# Patient Record
Sex: FEMALE | Race: WHITE | NOT HISPANIC OR LATINO | Employment: UNEMPLOYED | URBAN - METROPOLITAN AREA
[De-identification: names, ages, dates, MRNs, and addresses within clinical notes are randomized per-mention and may not be internally consistent; named-entity substitution may affect disease eponyms.]

---

## 2017-06-01 ENCOUNTER — TRANSFERRED RECORDS (OUTPATIENT)
Dept: HEALTH INFORMATION MANAGEMENT | Facility: CLINIC | Age: 52
End: 2017-06-01

## 2017-06-09 ENCOUNTER — TRANSFERRED RECORDS (OUTPATIENT)
Dept: HEALTH INFORMATION MANAGEMENT | Facility: CLINIC | Age: 52
End: 2017-06-09

## 2017-06-19 ENCOUNTER — TRANSFERRED RECORDS (OUTPATIENT)
Dept: HEALTH INFORMATION MANAGEMENT | Facility: CLINIC | Age: 52
End: 2017-06-19

## 2017-10-19 ENCOUNTER — TRANSFERRED RECORDS (OUTPATIENT)
Dept: HEALTH INFORMATION MANAGEMENT | Facility: CLINIC | Age: 52
End: 2017-10-19

## 2017-10-23 ENCOUNTER — MEDICAL CORRESPONDENCE (OUTPATIENT)
Dept: HEALTH INFORMATION MANAGEMENT | Facility: CLINIC | Age: 52
End: 2017-10-23

## 2017-11-15 DIAGNOSIS — H53.10 SUBJECTIVE VISUAL DISTURBANCE: Primary | ICD-10-CM

## 2017-12-21 ENCOUNTER — OFFICE VISIT (OUTPATIENT)
Dept: OPHTHALMOLOGY | Facility: CLINIC | Age: 52
End: 2017-12-21
Attending: OPHTHALMOLOGY
Payer: COMMERCIAL

## 2017-12-21 DIAGNOSIS — Z13.5 ENCOUNTER FOR SCREENING FOR EYE AND EAR DISORDERS: ICD-10-CM

## 2017-12-21 DIAGNOSIS — Z13.5 ENCOUNTER FOR SCREENING FOR EYE AND EAR DISORDERS: Primary | ICD-10-CM

## 2017-12-21 DIAGNOSIS — Z85.43 OVARIAN CANCER IN REMISSION: ICD-10-CM

## 2017-12-21 DIAGNOSIS — H53.10 SUBJECTIVE VISUAL DISTURBANCE: ICD-10-CM

## 2017-12-21 DIAGNOSIS — H53.60 NYCTALOPIA: Primary | ICD-10-CM

## 2017-12-21 DIAGNOSIS — H53.40 VISUAL FIELD DEFECT: ICD-10-CM

## 2017-12-21 PROCEDURE — 92275 FFERG OU (BOTH EYES): CPT | Mod: ZF

## 2017-12-21 PROCEDURE — 40000269 ZZH STATISTIC NO CHARGE FACILITY FEE: Mod: ZF

## 2017-12-21 PROCEDURE — 99214 OFFICE O/P EST MOD 30 MIN: CPT | Mod: ZF | Performed by: TECHNICIAN/TECHNOLOGIST

## 2017-12-21 PROCEDURE — 92133 CPTRZD OPH DX IMG PST SGM ON: CPT | Mod: ZF | Performed by: OPHTHALMOLOGY

## 2017-12-21 PROCEDURE — 92083 EXTENDED VISUAL FIELD XM: CPT | Mod: ZF | Performed by: OPHTHALMOLOGY

## 2017-12-21 RX ORDER — CITALOPRAM HYDROBROMIDE 40 MG/1
40 TABLET ORAL
COMMUNITY
Start: 2014-12-03

## 2017-12-21 RX ORDER — EPINEPHRINE 0.3 MG/.3ML
INJECTION SUBCUTANEOUS
COMMUNITY
Start: 2015-01-28

## 2017-12-21 RX ORDER — FUROSEMIDE 40 MG
40 TABLET ORAL
COMMUNITY
Start: 2015-01-19

## 2017-12-21 RX ORDER — OXYCODONE AND ACETAMINOPHEN 7.5; 325 MG/1; MG/1
TABLET ORAL
COMMUNITY
Start: 2015-07-07

## 2017-12-21 RX ORDER — CLINDAMYCIN HCL 150 MG
300 CAPSULE ORAL
COMMUNITY
Start: 2015-12-27

## 2017-12-21 RX ORDER — MAGNESIUM 200 MG
1000 TABLET ORAL
COMMUNITY
Start: 2011-10-13

## 2017-12-21 RX ORDER — PREGABALIN 75 MG/1
75 CAPSULE ORAL
COMMUNITY
Start: 2015-06-11

## 2017-12-21 RX ORDER — LISINOPRIL 10 MG/1
10 TABLET ORAL
COMMUNITY
Start: 2014-12-19

## 2017-12-21 ASSESSMENT — CONF VISUAL FIELD
OS_NORMAL: 1
OD_NORMAL: 1
METHOD: COUNTING FINGERS

## 2017-12-21 ASSESSMENT — VISUAL ACUITY
OS_CC+: -1+2
OS_PH_CC: 20/25
OD_CC+: -2
OS_PH_CC: 20/25
OD_CC: 20/30
OS_CC+: -1+2
OS_CC: 20/30
OD_CC: 20/30
METHOD: SNELLEN - LINEAR
OS_CC: 20/30
METHOD: SNELLEN - LINEAR
OD_CC+: -2
CORRECTION_TYPE: GLASSES

## 2017-12-21 ASSESSMENT — REFRACTION_WEARINGRX
OS_AXIS: 090
OD_HPRISM: 1 BO
OD_CYLINDER: +0.75
SPECS_TYPE: BIFOCAL
OD_HPRISM: 1 BO
OD_ADD: +2.00
OS_HPRISM: 1 BO
OD_ADD: +2.00
SPECS_TYPE: BIFOCAL
OD_CYLINDER: +0.75
OS_CYLINDER: +1.25
OS_SPHERE: -1.75
OD_SPHERE: -1.00
OD_AXIS: 080
OS_CYLINDER: +1.25
OD_AXIS: 080
OS_SPHERE: -1.75
OD_SPHERE: -1.00
OS_ADD: +2.00
OS_HPRISM: 1 BO
OS_ADD: +2.00
OS_AXIS: 090

## 2017-12-21 ASSESSMENT — TONOMETRY
IOP_METHOD: ICARE
OD_IOP_MMHG: 18
OS_IOP_MMHG: 18

## 2017-12-21 ASSESSMENT — EXTERNAL EXAM - LEFT EYE: OS_EXAM: NORMAL

## 2017-12-21 ASSESSMENT — SLIT LAMP EXAM - LIDS
COMMENTS: NORMAL
COMMENTS: NORMAL

## 2017-12-21 ASSESSMENT — EXTERNAL EXAM - RIGHT EYE: OD_EXAM: NORMAL

## 2017-12-21 NOTE — PROGRESS NOTES
"2017    STANDARD ERG REPORT    Referring: Dr. Claus Chavarria.     RE:  Adela Kelly  MRN:  [id number]  :  1965    ERG Date:  2017    CLINICAL HISTORY:  Adela Kelly is a 52 year old year-old patient with history of fuzzy and blurry vision, referred for a ffERG.  +Astig since childhood and new gls since presbyopia help but still difficulty with vision. Reports difficulty with night driving (rain/snow is worse), needs to focus more and maintain attention straight ahead.  If she moves head side to side or blinks or has to change lanes, \"partial image\" in front of her moves so that she sees 2 sets of lines on the road.  Reflective street signs and glare from headlights can \"blind\". Worsening probs with depth perception where she has missed steps and fallen x3 within 1-month's time.    IMPRESSION:  1. mild, non-specific electroretinogram changes of unknown clinical significance.       2.  likely Normal electroretinogram                 Visual Acuity Right Eye : 20/30-2, PHNI       w/gls, -1.00 + 0.75x080 1BO prism    Visual Acuity Left Eye : 20/30-1+2, PH 20/25      w/gls, -1.75 + 1.25x090, 1BO prism                         ALL AVERAGED  Data for Full-Field ERG Right Eye   Left Eye    Dark-Adapted Patient Normal Patient   0.01 ERG (fanta) amplitude( v) 223 101.6-336.1 241   0.01 ERG (fanta) implicit time(ms) 96.5 73.2-106.2 95.7   MMMMM      3.0 ERG (combined) a-wave amplitude( v) -163 -218.0 to -67.8 -178   3.0 ERG (combined) a-wave implicit time(ms) 15.8 13.0-22.8 15.8   3.0 ERG (combined) b-wave amplitude( v) 307 124.1-414.8 333   3.0 ERG (combined) b-wave implicit time(ms) 49.9 29.7-52.8 49.9   MMMMM      3.0 Oscillatory Potentials   Present     Light-Adapted      3.0 Flicker (30-Hz) amplitude( v) 63 77.8-138.3 64   3.0 Flicker (30-Hz) implicit time(ms) 25.8 23.9-28.3 26.6         3.0 ERG (cone) a-wave amplitude( v) -19 -59.4 to -9.1 -21   3.0 ERG (cone) a-wave implicit time(ms) 15 " 16.2-18.0 15   3.0 ERG (cone) b-wave amplitude( v) 81 52.7-185.6 84   3.0 ERG (cone) b-wave implicit time(ms) 30 26.2-31.3 30      *=manipulated cursors    INTERPRETATION:      The electroretinogram was performed according to ISCEV standards using ESPION E3 system and DTL fiber recording electrodes. The patient tolerated the testing well. The waveforms are fairly reproducible and well formed. The responses in between both eyes were similar. The normative values provided above represent the 95% confidence limits for a normal individual the age of the patient. The patient s responses are averaged.    In scotopic conditions, the fanta specific responses were normal in both eyes.  The maximal response, a combined fanta and cone responses were essentially normal and the implicit time was normal in both eyes.   In light adapted conditions, the 30-Hz flicker response has mild decreased amplitude both eyes and normal implicit time in both eyes. The single photopic flash response are normal.    Conclusion: This represents a likely normal electroretinogram  with the exception of  mild decreased amplitude of the 30-Hz flicker responses both eyes. This is a nonspecific finding. Certainly, there is no significant loss of fanta or cone photoreceptors.     Clinical correlation is recommended.       I would recommend a repeated electroretinogram in a couple of years if there continues to be concern regarding a possible retinal dystrophy.     Dear Elvin, thank you for the opportunity to provide electrophysiologic services for this patient.  Please do not hesitate to call if there should be any questions regarding these results.    Leigha Pablo MD     Retina Service   Department of Ophthalmology and Visual Neurosciences   [facility]  Phone: [phone]   Fax: [phone]

## 2017-12-21 NOTE — LETTER
2017         RE:  :  MRN: Adela Kelly  1965  [id number]     Dear Dr. Chavarria,    Thank you for asking me to see your very pleasant patient, Adela Kelly, in neuro-ophthalmic consultation.  I would like to thank you for sending your records and I have summarized them in the history of present illness. She presented with her spouse who provided additional history.  My assessment and plan are below.  For further details, please see my attached clinic note.      Assessment & Plan     Adela Kelly is a 52 year old female with the following diagnoses:   1. Nyctalopia    2. Subjective visual disturbance    3. Ovarian cancer in remission    4. Visual field defect       She noted problems with depth perception when she was a young woman. She endorses blurry vision both eyes.  Overall, it has gotten worse.  Glasses did not seem to help.  She describes that objects  Seem to move.  As she looks at me, she thinks my eyes drift to the right and then jerk back.  When she looks at a white wall she sees stuff moving for many years.  She thinks it looks like television snow.  She struggles with checkerboard patterns and intricate patterns in her vision.  She endorses palinopsia.  No history of LSD or mescaline. No use of trazodone.   Denies perseveration of visual images.      She had ovarian cancer.  She had a resection. She underwent chemo in .  She endorses nyctalopia for many years.      Many of her symptoms sound migrainous including photophobia, persistent positive visual phenomena of migraine, difficulty with patterns.  She also has nyctalopia and a history of ovarian cancer.  Will obtain an electroretinogram (ERG) to make sure this is not paraneoplastic.     If the ERG is borderline or normal then the symptoms are nearly migraine-related in origin.  If the ERG is severely depressed, then I would perform a paraneoplastic workup including a CAT scan of the chest abdomen and pelvis.  I will also order a   lab tests to determine whether she has evidence of recurrence of her ovarian cancer.    Again, thank you for allowing me to participate in the care of your patient.      Sincerely,    Claus Whitney MD  Professor, Neuro-Ophthalmology  Department of Ophthalmology and Visual Neurosciences  [facility]    CC: VARUN [address] [facility] [address]  VIA Facsimile: [phone]     Alise Foley NP  [facility]  [address]  [place] [address]  VIA Facsimile: [phone]     Charles A Tietz, MD  [facility]  [address] [facility] [address]  VIA Facsimile: [phone]

## 2017-12-21 NOTE — LETTER
"2017    STANDARD ERG REPORT    Referring: Dr. Claus Whitney                        RE:  Adela Kelly  MRN:  [id number]  :  1965    ERG Date:  2017    CLINICAL HISTORY:   Adela Kelly is a 52-year-old patient with history of fuzzy and blurry vision, referred for a ffERG.  +Astig since childhood and new gls since presbyopia help but still difficulty with vision. Reports difficulty with night driving (rain/snow is worse), needs to focus more and maintain attention straight ahead.  If she moves head side to side or blinks or has to change lanes, \"partial image\" in front of her moves so that she sees 2 sets of lines on the road.  Reflective street signs and glare from headlights can \"blind\". Worsening probs with depth perception where she has missed steps and fallen x3 within 1-months' time.    IMPRESSION:  1. Mild, non-specific electroretinogram changes of unknown clinical significance     2. Likely normal electroretinogram                 Visual Acuity: Right Eye:   20/30-2, PHNI             w/gls, -1.00 + 0.75x080 1BO prism    Visual Acuity:  Left Eye:     20/30-1+2, PH 20/25            w/gls, -1.75 + 1.25x090, 1BO prism                                        ALL AVERAGED  Data for Full-Field ERG Right Eye   Left Eye    Dark-Adapted Patient Normal Patient   0.01 ERG (fanta) amplitude( v) 223 101.6-336.1 241   0.01 ERG (fanta) implicit time(ms) 96.5 73.2-106.2 95.7   MMMMM      3.0 ERG (combined) a-wave amplitude( v) -163 -218.0 to -67.8 -178   3.0 ERG (combined) a-wave implicit time(ms) 15.8 13.0-22.8 15.8   3.0 ERG (combined) b-wave amplitude( v) 307 124.1-414.8 333   3.0 ERG (combined) b-wave implicit time(ms) 49.9 29.7-52.8 49.9   MMMMM      3.0 Oscillatory Potentials   Present     Light-Adapted      3.0 Flicker (30-Hz) amplitude( v) 63 77.8-138.3 64   3.0 Flicker (30-Hz) implicit time(ms) 25.8 23.9-28.3 26.6         3.0 ERG (cone) a-wave amplitude( v) -19 -59.4 to -9.1 -21   3.0 ERG " (cone) a-wave implicit time(ms) 15 16.2-18.0 15   3.0 ERG (cone) b-wave amplitude( v) 81 52.7-185.6 84   3.0 ERG (cone) b-wave implicit time(ms) 30 26.2-31.3 30                  *=manipulated cursors    INTERPRETATION:  This electroretinogram was performed according to ISCEV standards using the ESPION E3 system and DTL fiber recording electrodes. The patient tolerated the testing well. The waveforms are fairly reproducible and well formed. The responses in between both eyes were similar. The normative values provided above represent the 95% confidence limits for a normal individual the age of the patient. The patient s responses are averaged.    In scotopic conditions, the fanta-specific responses were normal in both eyes. The maximal response, a combined fanta and cone response, was essentially normal and the implicit time was normal in both eyes.     In light-adapted conditions, the 30-Hz flicker response has mildly decreased amplitude in both eyes and normal implicit time in both eyes. The single photopic flash responses are normal.    CONCLUSION: This represents a likely normal electroretinogram with the exception of mildly decreased amplitude of the 30-Hz flicker responses in both eyes. This is a nonspecific finding. Certainly, there is no significant loss of fanta or cone photoreceptors. Clinical correlation is recommended.       I would recommend a repeat electroretinogram in a couple of years if there continues to be concern regarding a possible retinal dystrophy.     Elvin, thank you for the opportunity to provide electrophysiologic services for this patient.  Please do not hesitate to call if there should be any questions regarding these results.    Sincerely,    Leigha Pablo MD     Retina Service   Department of Ophthalmology and Visual Neurosciences   [facility]  Phone: [phone]   Fax: [phone]        cc:  Jamey Chavarria MD

## 2017-12-21 NOTE — MR AVS SNAPSHOT
After Visit Summary   12/21/2017    Adela Kelly    MRN: [id number]           Patient Information     Date Of Birth          1965        Visit Information        Provider Department      12/21/2017 7:30 AM Claus Whitney MD [facility]        Today's Diagnoses     Nyctalopia    -  1    Subjective visual disturbance        Ovarian cancer in remission        Visual field defect           Follow-ups after your visit        Additional Services     ERG Referral                 Your next 10 appointments already scheduled     Dec 21, 2017  1:00 PM CST   Eye Diagnostics with [facility] EYE ELECTRODIAGNOSTIC   Eye Clinic ([facility])    [address]  [address]  [address]  [facility] [address]   [phone]              Future tests that were ordered for you today     Open Future Orders        Priority Expected Expires Ordered     Routine  3/21/2018 12/21/2017            Who to contact     Please call your clinic at [phone] to:    Ask questions about your health    Make or cancel appointments    Discuss your medicines    Learn about your test results    Speak to your doctor   If you have compliments or concerns about an experience at your clinic, or if you wish to file a complaint, please contact [facility] Patient Relations at [phone] or email us at [email or web address]         Additional Information About Your Visit        MyChart Information     [employer] is an electronic gateway that provides easy, online access to your medical records. With [employer], you can request a clinic appointment, read your test results, renew a prescription or communicate with your care team.     To sign up for Tinker [employer] visit the website at [email or web address]   You will be asked to enter the access code listed below, as well as some personal information. Please follow the directions to create your username and password.     Your  access code is: [email or web address]  Expires: 2018  5:30 AM     Your access code will  in 90 days. If you need help or a new code, please contact your [facility] or call [phone] for assistance.        Care EveryWhere ID     This is your Care EveryWhere ID. This could be used by other organizations to access your [place] medical records  [id number]         Blood Pressure from Last 3 Encounters:   No data found for BP    Weight from Last 3 Encounters:   No data found for Wt              We Performed the Following     ERG Referral     Glaucoma Top OU     OCT Optic Nerve RNFL Spectralis OU (both eyes)        Primary Care Provider Office Phone # Fax #    Alise Foley [phone]       [facility] [address] [place] [address]        Equal Access to Services     MARIELLE ROSS : Hadii aad ku hadasho Soomaali, waaxda luqadaha, qaybta kaalmada adeegyada, waxay grover hayzenaida lorenzo . So [facility] [place] [phone].    ATENCIÓN: Si habla español, tiene a callahan disposición servicios gratuitos de asistencia lingüística. Dhruv al [phone].    We comply with applicable federal civil rights laws and [place] laws. We do not discriminate on the basis of race, color, national origin, age, disability, sex, sexual orientation, or gender identity.            Thank you!     Thank you for choosing EYE CLINIC  for your care. Our goal is always to provide you with excellent care. Hearing back from our patients is one way we can continue to improve our services. Please take a few minutes to complete the written survey that you may receive in the mail after your visit with us. Thank you!             Your Updated Medication List - Protect others around you: Learn how to safely use, store and throw away your medicines at www.disposemymeds.org.          This list is accurate as of: 17 11:15 AM.  Always use your most recent med list.                   Brand  Name Dispense Instructions for use Diagnosis    B-100 COMPLEX Tabs      Take 100 mg by mouth        citalopram 40 MG tablet    celeXA     Take 40 mg by mouth        clindamycin 150 MG capsule    CLEOCIN     Take 300 mg by mouth        cyanocobalamin 1000 MCG Subl sublingual tablet      Place 1,000 mcg under the tongue        EPINEPHrine 0.3 MG/0.3ML injection 2-pack    EPIPEN/ADRENACLICK/or ANY BX GENERIC EQUIV          furosemide 40 MG tablet    LASIX     Take 40 mg by mouth        lisinopril 10 MG tablet    PRINIVIL/ZESTRIL     Take 10 mg by mouth        oxyCODONE-acetaminophen 7.5-325 MG per tablet    PERCOCET          pregabalin 75 MG capsule    LYRICA     Take 75 mg by mouth

## 2017-12-21 NOTE — PROGRESS NOTES
Assessment & Plan     Adela Kelly is a 52 year old female with the following diagnoses:   1. Nyctalopia    2. Subjective visual disturbance    3. Ovarian cancer in remission    4. Visual field defect       She noted problems with depth perception when she was a young woman. She endorses blurry vision both eyes.  Overall, it has gotten worse.  Glasses did not seem to help.  She describes that objects  Seem to move.  As she looks at me, she thinks my eyes drift to the right and then jerk back.  When she looks at a white wall she sees stuff moving for many years.  She thinks it looks like television snow.  She struggles with checkerboard patterns and intricate patterns in her vision.  She endorses palinopsia.  No history of LSD or mescaline. No use of trazodone.   Denies perseveration of visual images.      She had ovarian cancer.  She had a resection. She underwent chemo in 2009.  She endorses nyctalopia for many years.      Many of her symptoms sound migrainous including photophobia, persistent positive visual phenomena of migraine, difficulty with patterns.  She also has nyctalopia and a history of ovarian cancer.  Will obtain an electroretinogram (ERG) to make sure this is not paraneoplastic.     If the ERG is borderline or normal then the symptoms are nearly migraine-related in origin.  If the ERG is severely depressed, then I would perform a paraneoplastic workup including a CAT scan of the chest abdomen and pelvis.  I will also order a  lab tests to determine whether she has evidence of recurrence of her ovarian cancer.           Attending Physician Attestation:  Complete documentation of historical and exam elements from today's encounter can be found in the full encounter summary report (not reduplicated in this progress note).  I personally obtained the chief complaint(s) and history of present illness.  I confirmed and edited as necessary the review of systems, past medical/surgical history,  family history, social history, and examination findings as documented by others; and I examined the patient myself.  I personally reviewed the relevant tests, images, and reports as documented above.  I formulated and edited as necessary the assessment and plan and discussed the findings and management plan with the patient and family. - Claus Whitney MD

## 2017-12-21 NOTE — NURSING NOTE
"Referred by Dr. Whitney for ffERG same day.  See outside outpt notes from Dr. Jamey Chavarria.  C/O fuzzy, blurry intermediate vision.  +Astig since childhood and new gls since presbyopia also help but still difficulty w/vision.  Especially for night driving (rain/snow is worse), needs to focus more and maintain attention straight ahead.  If she moves head side to side or blinks or has to change lanes, \"partial image\" in front of her moves so that she sees 2 sets of lines on the road.  Reflective street signs and glare from headlights can \"blind\" or \"hypnotize\".  Mentions H/O ovarian cancer txd w/chemo 2009 and believes vision issues started after tx.  +Fibromyalgia dxd 2003.  +Severe probs w/cartilage loss between joints; S/P reconstruction both ankles and heels ?2015 d/t cartilage loss.  Worsening probs w/depth perception where she has missed steps and fallen x3 within 1-month's time.  Also worsening hearing loss R>>L (recently got hearing aids) and memory loss and numbness w/sharp, poking pain in legs and balance issues (son thinks she appears drunken)-->will be seeing NeuroSurg at home.  Concerned about being able to take care of 2 adopted sons w/autism if her vision/balance issues worsen.  Accompanied by .  No f/u yet scheduled w/Dr. Chavarria; will await results.   "

## 2017-12-21 NOTE — NURSING NOTE
"Chief Complaints and History of Present Illnesses   Patient presents with     New Patient     Visual field defect with history of cancer     HPI    Symptoms:              Comments:  New patient, 53 yo F, referred for visual field defects with h/o cancer.     Patient reports VF defect as \" Everything is blurry and moving up and down.\" Patient states depth perception and balance is off.  Patient currently has migraines. Patient sees rainbow when looking at ceiling lights and there are beams/rays light streak coming down.   Double vision per patient that does not resolve with closing either eye.   Currently has prism in glasses.  ASHLEY Zapien 12/21/2017 8:54 AM                "

## 2018-05-10 ENCOUNTER — OFFICE VISIT (OUTPATIENT)
Dept: PSYCHOLOGY | Facility: OTHER | Age: 53
End: 2018-05-10
Attending: COUNSELOR
Payer: COMMERCIAL

## 2018-05-10 DIAGNOSIS — F31.81 BIPOLAR 2 DISORDER, MAJOR DEPRESSIVE EPISODE (H): Primary | ICD-10-CM

## 2018-05-10 DIAGNOSIS — F43.12 CHRONIC POST-TRAUMATIC STRESS DISORDER (PTSD): ICD-10-CM

## 2018-05-10 DIAGNOSIS — F41.1 GAD (GENERALIZED ANXIETY DISORDER): ICD-10-CM

## 2018-05-10 PROCEDURE — 90791 PSYCH DIAGNOSTIC EVALUATION: CPT | Performed by: COUNSELOR
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
## 2018-05-10 NOTE — MR AVS SNAPSHOT
After Visit Summary   5/10/2018    Adela Kelly    MRN: [id number]           Patient Information     Date Of Birth          1965        Visit Information        Provider Department      5/10/2018 10:00 AM Lexi Moreno, [facility]        Today's Diagnoses     Bipolar 2 disorder, major depressive episode (H)    -  1    Chronic post-traumatic stress disorder (PTSD)        JENNIFER (generalized anxiety disorder)           Follow-ups after your visit        Your next 10 appointments already scheduled     May 22, 2018  1:30 PM CDT   (Arrive by 1:15 PM)   Return Visit with Lexi Moreno [facility] ([facility] )    [address] [place] [id number] [phone]            May 29, 2018 12:30 PM CDT   (Arrive by 12:15 PM)   [facility]    [address] [place] [id number] [phone]              Who to contact     If you have questions or need follow up information about today's clinic visit or your schedule please contact [facility] directly at [phone].  Normal or non-critical lab and imaging results will be communicated to you by MyChart, letter or phone within 4 business days after the clinic has received the results. If you do not hear from us within 7 days, please contact the clinic through MyChart or phone. If you have a critical or abnormal lab result, we will notify you by phone as soon as possible.  Submit refill requests through [employer] or call your pharmacy and they will forward the refill request to us. Please allow 3 business days for your refill to be completed.          Additional Information About Your Visit        MyChart Information     [employer] lets you send messages to your doctor, view your test results, renew your prescriptions, schedule appointments and more. To sign up, go to [email or web address] .  "Click on \"Log in\" on the left side of the screen, which will take you to the Welcome page. Then click on \"Sign up Now\" on the right side of the page.     You will be asked to enter the access code listed below, as well as some personal information. Please follow the directions to create your username and password.     Your access code is: [email or web address]  Expires: 2018  8:29 AM     Your access code will  in 90 days. If you need help or a new code, please call your [place] clinic or [phone].        Care EveryWhere ID     This is your Care EveryWhere ID. This could be used by other organizations to access your [place] medical records  [id number]         Blood Pressure from Last 3 Encounters:   No data found for BP    Weight from Last 3 Encounters:   No data found for Wt              Today, you had the following     No orders found for display       Primary Care Provider Office Phone # Fax #    Alise Foley [phone]       [facility] [address] [place] [facility] [address]        Equal Access to Services     [facility]: Hadii aad ku hadasho Soomaali, waaxda luqadaha, qaybta kaalmada adetonyyachelsea, ayde lorenzo . So [facility] [phone].    ATENCIÓN: Si habla español, tiene a callahan disposición servicios gratuitos de asistencia lingüística. [facility] [phone].    We comply with applicable federal civil rights laws and [place] laws. We do not discriminate on the basis of race, color, national origin, age, disability, sex, sexual orientation, or gender identity.            Thank you!     Thank you for choosing [facility]  for your care. Our goal is always to provide you with excellent care. Hearing back from our patients is one way we can continue to improve our services. Please take a few minutes to complete the written survey that you may receive in the mail after your visit with us. Thank you!             Your Updated Medication List - " Protect others around you: Learn how to safely use, store and throw away your medicines at www.disposemymeds.org.          This list is accurate as of 5/10/18 11:59 PM.  Always use your most recent med list.                   Brand Name Dispense Instructions for use Diagnosis    B-100 COMPLEX Tabs      Take 100 mg by mouth        citalopram 40 MG tablet    celeXA     Take 40 mg by mouth        clindamycin 150 MG capsule    CLEOCIN     Take 300 mg by mouth        cyanocobalamin 1000 MCG Subl sublingual tablet      Place 1,000 mcg under the tongue        EPINEPHrine 0.3 MG/0.3ML injection 2-pack    EPIPEN/ADRENACLICK/or ANY BX GENERIC EQUIV          furosemide 40 MG tablet    LASIX     Take 40 mg by mouth        lisinopril 10 MG tablet    PRINIVIL/ZESTRIL     Take 10 mg by mouth        oxyCODONE-acetaminophen 7.5-325 MG per tablet    PERCOCET          pregabalin 75 MG capsule    LYRICA     Take 75 mg by mouth

## 2018-05-14 PROBLEM — F43.12 CHRONIC POST-TRAUMATIC STRESS DISORDER (PTSD): Status: ACTIVE | Noted: 2018-05-14

## 2018-05-14 PROBLEM — F31.81 BIPOLAR 2 DISORDER, MAJOR DEPRESSIVE EPISODE (H): Status: ACTIVE | Noted: 2018-05-14

## 2018-05-14 PROBLEM — F41.1 GAD (GENERALIZED ANXIETY DISORDER): Status: ACTIVE | Noted: 2018-05-14

## 2018-05-22 ENCOUNTER — OFFICE VISIT (OUTPATIENT)
Dept: PSYCHOLOGY | Facility: OTHER | Age: 53
End: 2018-05-22
Attending: COUNSELOR
Payer: COMMERCIAL

## 2018-05-22 DIAGNOSIS — F31.81 BIPOLAR 2 DISORDER, MAJOR DEPRESSIVE EPISODE (H): Primary | ICD-10-CM

## 2018-05-22 PROCEDURE — 90837 PSYTX W PT 60 MINUTES: CPT | Performed by: COUNSELOR

## 2018-05-22 ASSESSMENT — ANXIETY QUESTIONNAIRES
2. NOT BEING ABLE TO STOP OR CONTROL WORRYING: MORE THAN HALF THE DAYS
4. TROUBLE RELAXING: NEARLY EVERY DAY
IF YOU CHECKED OFF ANY PROBLEMS ON THIS QUESTIONNAIRE, HOW DIFFICULT HAVE THESE PROBLEMS MADE IT FOR YOU TO DO YOUR WORK, TAKE CARE OF THINGS AT HOME, OR GET ALONG WITH OTHER PEOPLE: SOMEWHAT DIFFICULT
GAD7 TOTAL SCORE: 18
7. FEELING AFRAID AS IF SOMETHING AWFUL MIGHT HAPPEN: NEARLY EVERY DAY
5. BEING SO RESTLESS THAT IT IS HARD TO SIT STILL: NEARLY EVERY DAY
6. BECOMING EASILY ANNOYED OR IRRITABLE: MORE THAN HALF THE DAYS
3. WORRYING TOO MUCH ABOUT DIFFERENT THINGS: MORE THAN HALF THE DAYS
1. FEELING NERVOUS, ANXIOUS, OR ON EDGE: NEARLY EVERY DAY
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
## 2018-05-22 NOTE — MR AVS SNAPSHOT
"              After Visit Summary   5/22/2018    Adela Kelly    MRN: [id number]           Patient Information     Date Of Birth          1965        Visit Information        Provider Department      5/22/2018 1:30 PM Lexi Moreno [facility]        Today's Diagnoses     Bipolar 2 disorder, major depressive episode (H)    -  1       Follow-ups after your visit        Your next 10 appointments already scheduled     May 29, 2018 12:30 PM CDT   (Arrive by 12:15 PM)   [facility] )    [address] [place] [id number] [phone]              Who to contact     If you have questions or need follow up information about today's clinic visit or your schedule please contact [facility] directly at [phone].  Normal or non-critical lab and imaging results will be communicated to you by MyChart, letter or phone within 4 business days after the clinic has received the results. If you do not hear from us within 7 days, please contact the clinic through MyChart or phone. If you have a critical or abnormal lab result, we will notify you by phone as soon as possible.  Submit refill requests through [employer] or call your pharmacy and they will forward the refill request to us. Please allow 3 business days for your refill to be completed.          Additional Information About Your Visit        MyChart Information     [employer] lets you send messages to your doctor, view your test results, renew your prescriptions, schedule appointments and more. To sign up, go to [email or web address] . Click on \"Log in\" on the left side of the screen, which will take you to the Welcome page. Then click on \"Sign up Now\" on the right side of the page.     You will be asked to enter the access code listed below, as well as some personal information. Please follow the directions to create your username and password.   "   Your access code is: [email or web address]  Expires: 2018  8:29 AM     Your access code will  in 90 days. If you need help or a new code, please call your [facility] or [phone].        Care EveryWhere ID     This is your Care EveryWhere ID. This could be used by other organizations to access your [place] medical records  [id number]         Blood Pressure from Last 3 Encounters:   No data found for BP    Weight from Last 3 Encounters:   No data found for Wt              Today, you had the following     No orders found for display       Primary Care Provider Office Phone # Fax #    Alise Foley [phone]       [facility] [address] [place] [address]        Equal Access to Services     [facility] : Hadii jolie berry hadasho Soomaali, waaxda luqadaha, qaybta kaalmada adeegyada, waxay idiin hayfredericn adetony lorenzo . So [facility] [phone].    ATENCIÓN: Si habla español, tiene a callahan disposición servicios gratuitos de asistencia lingüística. Llame al [phone].    We comply with applicable federal civil rights laws and [place] laws. We do not discriminate on the basis of race, color, national origin, age, disability, sex, sexual orientation, or gender identity.            Thank you!     Thank you for choosing [facility]  for your care. Our goal is always to provide you with excellent care. Hearing back from our patients is one way we can continue to improve our services. Please take a few minutes to complete the written survey that you may receive in the mail after your visit with us. Thank you!             Your Updated Medication List - Protect others around you: Learn how to safely use, store and throw away your medicines at www.disposemymeds.org.          This list is accurate as of 18 11:59 PM.  Always use your most recent med list.                   Brand Name Dispense Instructions for use Diagnosis    B-100 COMPLEX Tabs      Take 100 mg by  mouth        citalopram 40 MG tablet    celeXA     Take 40 mg by mouth        clindamycin 150 MG capsule    CLEOCIN     Take 300 mg by mouth        cyanocobalamin 1000 MCG Subl sublingual tablet      Place 1,000 mcg under the tongue        EPINEPHrine 0.3 MG/0.3ML injection 2-pack    EPIPEN/ADRENACLICK/or ANY BX GENERIC EQUIV          furosemide 40 MG tablet    LASIX     Take 40 mg by mouth        lisinopril 10 MG tablet    PRINIVIL/ZESTRIL     Take 10 mg by mouth        oxyCODONE-acetaminophen 7.5-325 MG per tablet    PERCOCET          pregabalin 75 MG capsule    LYRICA     Take 75 mg by mouth

## 2018-05-23 ASSESSMENT — PATIENT HEALTH QUESTIONNAIRE - PHQ9: SUM OF ALL RESPONSES TO PHQ QUESTIONS 1-9: 15

## 2018-05-23 ASSESSMENT — ANXIETY QUESTIONNAIRES: GAD7 TOTAL SCORE: 18

## 2018-05-29 ENCOUNTER — OFFICE VISIT (OUTPATIENT)
Dept: PSYCHOLOGY | Facility: OTHER | Age: 53
End: 2018-05-29
Attending: COUNSELOR
Payer: COMMERCIAL

## 2018-05-29 DIAGNOSIS — F41.1 GAD (GENERALIZED ANXIETY DISORDER): ICD-10-CM

## 2018-05-29 DIAGNOSIS — F43.12 CHRONIC POST-TRAUMATIC STRESS DISORDER (PTSD): ICD-10-CM

## 2018-05-29 DIAGNOSIS — F31.81 BIPOLAR 2 DISORDER, MAJOR DEPRESSIVE EPISODE (H): Primary | ICD-10-CM

## 2018-05-29 PROCEDURE — 90837 PSYTX W PT 60 MINUTES: CPT | Performed by: COUNSELOR

## 2018-05-29 ASSESSMENT — ANXIETY QUESTIONNAIRES
3. WORRYING TOO MUCH ABOUT DIFFERENT THINGS: MORE THAN HALF THE DAYS
5. BEING SO RESTLESS THAT IT IS HARD TO SIT STILL: SEVERAL DAYS
6. BECOMING EASILY ANNOYED OR IRRITABLE: SEVERAL DAYS
2. NOT BEING ABLE TO STOP OR CONTROL WORRYING: NEARLY EVERY DAY
1. FEELING NERVOUS, ANXIOUS, OR ON EDGE: MORE THAN HALF THE DAYS
GAD7 TOTAL SCORE: 12
4. TROUBLE RELAXING: MORE THAN HALF THE DAYS
IF YOU CHECKED OFF ANY PROBLEMS ON THIS QUESTIONNAIRE, HOW DIFFICULT HAVE THESE PROBLEMS MADE IT FOR YOU TO DO YOUR WORK, TAKE CARE OF THINGS AT HOME, OR GET ALONG WITH OTHER PEOPLE: SOMEWHAT DIFFICULT
7. FEELING AFRAID AS IF SOMETHING AWFUL MIGHT HAPPEN: SEVERAL DAYS

## 2018-05-29 NOTE — PROGRESS NOTES
"_____________________________________________________________________       [facility]  May 29, 2018    Behavioral Health Clinician Progress Note    Patient Name: Adela Kelly         Service Type: Individual           Service Location:  Face to Face in Clinic      Session Start Time:  12:30 pm   Session End Time: 1:30 pm      Session Length: 53 - 60      Attendees: Client              Session number 3  Diagnosis   Diagnosis Comments   1. Bipolar 2 disorder, major depressive episode (H)     2. JENNIFER (generalized anxiety disorder)     3. Chronic post-traumatic stress disorder (PTSD)       Diagnostic Assessment Date: 5/10/18  Treatment Plan Review Date: 5/22/18    Previous PHQ-9:   PHQ-9 5/22/2018   Total Score 15   Q9: Suicide Ideation Not at all     Previous JENNIFER-7:   JENNIFER-7 SCORE 5/22/2018   Total Score 18       DATA  Extended Session (60+ minutes): No  Interactive Complexity: No  Crisis: No  [facility] Patient No    Treatment Objective(s) Addressed in This Session:  Target Behavior(s):  Depressed Mood: Increase interest, engagement, and pleasure in doing things  Decrease frequency and intensity of feeling down, depressed, hopeless  Improve concentration, focus, and mindfulness in daily activities   Anxiety: will experience a reduction in anxiety, will develop more effective coping skills to manage anxiety symptoms and will develop healthy cognitive patterns and beliefs  PTSD Symptom Management    Current Stressors / Issues:    Karie reported she had a headache during today's  Session. She stated she has physical pain today. She stated she has been feeling overwhelmed. She stated she started the \"little yellow pill\" a few days ago. She described the ups and downs of her Bi-polar. She stated she wants to be able to feel more \"tonya\" in her life. She identified the things in her life she is grateful for: children, family, and spirituality.     Progress on Treatment Objective(s) / Homework:  New Objective " established this session - PREPARATION (Decided to change - considering how); Intervened by negotiating a change plan and determining options / strategies for behavior change, identifying triggers, exploring social supports, and working towards setting a date to begin behavior change    Interventions:  Discussed used of Alpha Stim  Possible use of TF/CBT  Supportive listening, encouraged keeping a mood journal  CBT:  Discussed common cognitive distortions identified them in patient's life, Explored ways to challenge, replace, and act against these cognitions  SKILLS TRAINING: Explored skills useful to client in current situation Skills include assertiveness, communication, conflict management, problem-solving, relaxation, etc.  BEHAVIORAL ACTIVATION:  Discussed steps patient can take to become more involved in meaningful activity, Identified barriers to these activities and explored possible solutions  MINDFULLNESS-BASED-STRATEGIES:  Discussed skills based on development and application of mindfulness, Skills drawn from dialectical behavior therapy, mindfulness-based stress reduction, mindfulness-based cognitive therapy, etc.  RELAXATION INTERVENTIONS: Provided education and modeled, deep breathing, Progressive Muscle Relaxation, Guided Imagery, provided werbsite handout to practice at home    Care Plan review completed: Yes    Medication Review:  No changes to current psychiatric medication(s)    Medication Compliance:  Yes    Changes in Health Issues:   None reported    Chemical Use Review:   Substance Use: Chemical use reviewed, no active concerns identified      Tobacco Use: No current tobacco use.      Assessment: Current Emotional / Mental Status (status of significant symptoms):    Risk status (Self / Other harm or suicidal ideation)  Patient denies current fears or concerns for personal safety.  Patient denies current or recent suicidal ideation or behaviors.  Patient denies current or recent homicidal  ideation or behaviors.  Patient denies current or recent self injurious behavior or ideation.  Patient reports no other safety concerns  A safety and risk management plan has not been developed at this time, however patient was encouraged to call [place] [phone] should there be a change in any of these risk factors.    Appearance:   Appropriate   Eye Contact:   Good   Psychomotor Behavior: Normal   Attitude:   Cooperative   Orientation:   All  Speech   Rate / Production: Normal    Volume:  Normal   Mood:    Depressed   Affect:    Appropriate  tearful   Thought Content:  Clear   Thought Form:  Coherent  Logical   Insight:    Good     Collateral Reports Completed:  Not Applicable    Plan: (Homework, other):  Patient was given information about behavioral services and encouraged to schedule a follow up appointment with the clinic  in 2 weeks.  She was also given Mindfulness Strategies to practice when experiencing anxiety and depression.      Lexi Moreno LPCC

## 2018-05-29 NOTE — MR AVS SNAPSHOT
After Visit Summary   5/29/2018    Adela Kelly    MRN: [id number]           Patient Information     Date Of Birth          1965        Visit Information        Provider Department      5/29/2018 12:30 PM Lexi Moreno, [facility]        Today's Diagnoses     Bipolar 2 disorder, major depressive episode (H)    -  1    JENNIFER (generalized anxiety disorder)        Chronic post-traumatic stress disorder (PTSD)           Follow-ups after your visit        Your next 10 appointments already scheduled     Jun 12, 2018  8:00 AM CDT   (Arrive by 7:45 AM)   Return Visit with Lexi Moreno [facility] ([facility] )    [address] [place] [id number] [phone]            Jun 26, 2018  8:00 AM CDT   (Arrive by 7:45 AM)   [facility]    [address] [place] [id number] [phone]              Who to contact     If you have questions or need follow up information about today's clinic visit or your schedule please contact [facility] directly at [phone].  Normal or non-critical lab and imaging results will be communicated to you by MyChart, letter or phone within 4 business days after the clinic has received the results. If you do not hear from us within 7 days, please contact the clinic through MyChart or phone. If you have a critical or abnormal lab result, we will notify you by phone as soon as possible.  Submit refill requests through ElephantDrive or call your pharmacy and they will forward the refill request to us. Please allow 3 business days for your refill to be completed.          Additional Information About Your Visit        MyChart Information     ElephantDrive lets you send messages to your doctor, view your test results, renew your prescriptions, schedule appointments and more. To sign up, go to [email or web address] .  "Click on \"Log in\" on the left side of the screen, which will take you to the Welcome page. Then click on \"Sign up Now\" on the right side of the page.     You will be asked to enter the access code listed below, as well as some personal information. Please follow the directions to create your username and password.     Your access code is: [email or web address]  Expires: 2018  8:29 AM     Your access code will  in 90 days. If you need help or a new code, please call your [place] clinic or [phone].        Care EveryWhere ID     This is your Care EveryWhere ID. This could be used by other organizations to access your [place] medical records  [id number]         Blood Pressure from Last 3 Encounters:   No data found for BP    Weight from Last 3 Encounters:   No data found for Wt              Today, you had the following     No orders found for display       Primary Care Provider Office Phone # Fax #    Alise Foley [phone]       [facility] [address] [place] [facility] [address]        Equal Access to Services     [facility]: Hadii aad ku hadasho Soomaali, waaxda luqadaha, qaybta kaalmada adetonyyachelsea, ayde lorenzo . So [facility] [phone].    ATENCIÓN: Si habla español, tiene a callahan disposición servicios gratuitos de asistencia lingüística. [facility] [phone].    We comply with applicable federal civil rights laws and [place] laws. We do not discriminate on the basis of race, color, national origin, age, disability, sex, sexual orientation, or gender identity.            Thank you!     Thank you for choosing [facility]  for your care. Our goal is always to provide you with excellent care. Hearing back from our patients is one way we can continue to improve our services. Please take a few minutes to complete the written survey that you may receive in the mail after your visit with us. Thank you!             Your Updated Medication List - " Protect others around you: Learn how to safely use, store and throw away your medicines at www.disposemymeds.org.          This list is accurate as of 5/29/18  2:09 PM.  Always use your most recent med list.                   Brand Name Dispense Instructions for use Diagnosis    B-100 COMPLEX Tabs      Take 100 mg by mouth        citalopram 40 MG tablet    celeXA     Take 40 mg by mouth        clindamycin 150 MG capsule    CLEOCIN     Take 300 mg by mouth        cyanocobalamin 1000 MCG Subl sublingual tablet      Place 1,000 mcg under the tongue        EPINEPHrine 0.3 MG/0.3ML injection 2-pack    EPIPEN/ADRENACLICK/or ANY BX GENERIC EQUIV          furosemide 40 MG tablet    LASIX     Take 40 mg by mouth        lisinopril 10 MG tablet    PRINIVIL/ZESTRIL     Take 10 mg by mouth        oxyCODONE-acetaminophen 7.5-325 MG per tablet    PERCOCET          pregabalin 75 MG capsule    LYRICA     Take 75 mg by mouth

## 2018-05-30 ASSESSMENT — PATIENT HEALTH QUESTIONNAIRE - PHQ9: SUM OF ALL RESPONSES TO PHQ QUESTIONS 1-9: 14

## 2018-05-30 ASSESSMENT — ANXIETY QUESTIONNAIRES: GAD7 TOTAL SCORE: 12

## 2018-06-14 ENCOUNTER — OFFICE VISIT (OUTPATIENT)
Dept: PSYCHOLOGY | Facility: OTHER | Age: 53
End: 2018-06-14
Attending: COUNSELOR
Payer: COMMERCIAL

## 2018-06-14 DIAGNOSIS — F41.1 GAD (GENERALIZED ANXIETY DISORDER): ICD-10-CM

## 2018-06-14 DIAGNOSIS — F31.81 BIPOLAR 2 DISORDER, MAJOR DEPRESSIVE EPISODE (H): Primary | ICD-10-CM

## 2018-06-14 DIAGNOSIS — F43.12 CHRONIC POST-TRAUMATIC STRESS DISORDER (PTSD): ICD-10-CM

## 2018-06-14 PROCEDURE — 90837 PSYTX W PT 60 MINUTES: CPT | Performed by: COUNSELOR

## 2018-06-14 NOTE — MR AVS SNAPSHOT
"              After Visit Summary   6/14/2018    Adela Kelly    MRN: [id number]           Patient Information     Date Of Birth          1965        Visit Information        Provider Department      6/14/2018 2:30 PM Lexi Moreno, [facility]        Today's Diagnoses     Bipolar 2 disorder, major depressive episode (H)    -  1    JENNIFER (generalized anxiety disorder)        Chronic post-traumatic stress disorder (PTSD)           Follow-ups after your visit        Your next 10 appointments already scheduled     Jun 26, 2018  8:00 AM CDT   (Arrive by 7:45 AM)   Return Visit with Lexi Moreno, [facility] ([facility] )    [address] [place] [id number] [phone]              Who to contact     If you have questions or need follow up information about today's clinic visit or your schedule please contact [facility] directly at [phone].  Normal or non-critical lab and imaging results will be communicated to you by MyChart, letter or phone within 4 business days after the clinic has received the results. If you do not hear from us within 7 days, please contact the clinic through [employer] or phone. If you have a critical or abnormal lab result, we will notify you by phone as soon as possible.  Submit refill requests through ClearServe or call your pharmacy and they will forward the refill request to us. Please allow 3 business days for your refill to be completed.          Additional Information About Your Visit        Cool de Sachar[employer]ase My Sell Information     ClearServe lets you send messages to your doctor, view your test results, renew your prescriptions, schedule appointments and more. To sign up, go to [email or web address] . Click on \"Log in\" on the left side of the screen, which will take you to the Welcome page. Then click on \"Sign up Now\" on the right side of the page.     You will be asked to enter the access code listed below, as well as " some personal information. Please follow the directions to create your username and password.     Your access code is: [email or web address]  Expires: 2018  8:29 AM     Your access code will  in 90 days. If you need help or a new code, please call your [place] clinic or [phone].        Care EveryWhere ID     This is your Care EveryWhere ID. This could be used by other organizations to access your [place] medical records  [id number]         Blood Pressure from Last 3 Encounters:   No data found for BP    Weight from Last 3 Encounters:   No data found for Wt              Today, you had the following     No orders found for display       Primary Care Provider Office Phone # Fax #    Alise Foley [phone]       [facility] [address] [place] [address]        Equal Access to Services     [facility]: Hadii aad ku hadasho Soomaali, waaxda luqadaha, qaybta kaalmada adeegyada, ayde ness hayaaanthony lorenzo . So [facility] [phone].    ATENCIÓN: Si habla español, tiene a callahan disposición servicios gratuitos de asistencia lingüística. Llame al [phone].    We comply with applicable federal civil rights laws and [place] laws. We do not discriminate on the basis of race, color, national origin, age, disability, sex, sexual orientation, or gender identity.            Thank you!     Thank you for choosing [facility]  for your care. Our goal is always to provide you with excellent care. Hearing back from our patients is one way we can continue to improve our services. Please take a few minutes to complete the written survey that you may receive in the mail after your visit with us. Thank you!             Your Updated Medication List - Protect others around you: Learn how to safely use, store and throw away your medicines at www.disposemymeds.org.          This list is accurate as of 18 11:59 PM.  Always use your most recent med list.                    Brand Name Dispense Instructions for use Diagnosis    B-100 COMPLEX Tabs      Take 100 mg by mouth        citalopram 40 MG tablet    celeXA     Take 40 mg by mouth        clindamycin 150 MG capsule    CLEOCIN     Take 300 mg by mouth        cyanocobalamin 1000 MCG Subl sublingual tablet      Place 1,000 mcg under the tongue        EPINEPHrine 0.3 MG/0.3ML injection 2-pack    EPIPEN/ADRENACLICK/or ANY BX GENERIC EQUIV          furosemide 40 MG tablet    LASIX     Take 40 mg by mouth        lisinopril 10 MG tablet    PRINIVIL/ZESTRIL     Take 10 mg by mouth        oxyCODONE-acetaminophen 7.5-325 MG per tablet    PERCOCET          pregabalin 75 MG capsule    LYRICA     Take 75 mg by mouth

## 2018-06-15 ASSESSMENT — ANXIETY QUESTIONNAIRES
7. FEELING AFRAID AS IF SOMETHING AWFUL MIGHT HAPPEN: SEVERAL DAYS
1. FEELING NERVOUS, ANXIOUS, OR ON EDGE: MORE THAN HALF THE DAYS
GAD7 TOTAL SCORE: 13
6. BECOMING EASILY ANNOYED OR IRRITABLE: SEVERAL DAYS
IF YOU CHECKED OFF ANY PROBLEMS ON THIS QUESTIONNAIRE, HOW DIFFICULT HAVE THESE PROBLEMS MADE IT FOR YOU TO DO YOUR WORK, TAKE CARE OF THINGS AT HOME, OR GET ALONG WITH OTHER PEOPLE: SOMEWHAT DIFFICULT
3. WORRYING TOO MUCH ABOUT DIFFERENT THINGS: MORE THAN HALF THE DAYS
2. NOT BEING ABLE TO STOP OR CONTROL WORRYING: MORE THAN HALF THE DAYS
4. TROUBLE RELAXING: NEARLY EVERY DAY
5. BEING SO RESTLESS THAT IT IS HARD TO SIT STILL: MORE THAN HALF THE DAYS

## 2018-06-15 NOTE — PROGRESS NOTES
"_____________________________________________________________________       [facility]  May 29, 2018    Behavioral Health Clinician Progress Note    Patient Name: Adela Kelly         Service Type: Individual           Service Location:  Face to Face in Clinic      Session Start Time:  2:30 pm   Session End Time: 3:23 pm      Session Length: 53 - 60      Attendees: Client              Session number 4  Diagnosis   Diagnosis Comments   1. Bipolar 2 disorder, major depressive episode (H)     2. JENNIFER (generalized anxiety disorder)     3. Chronic post-traumatic stress disorder (PTSD)       Diagnostic Assessment Date: 5/10/18  Treatment Plan Review Date: 5/22/18    Previous PHQ-9:   PHQ-9 5/22/2018 5/29/2018 6/15/2018   Total Score 15 14 13   Q9: Suicide Ideation Not at all Not at all Not at all     Previous JENNIFER-7:   JENNIFER-7 SCORE 5/22/2018 5/29/2018 6/15/2018   Total Score 18 12 13       DATA  Extended Session (60+ minutes): No  Interactive Complexity: No  Crisis: No  [facility] Patient No    Treatment Objective(s) Addressed in This Session:  Target Behavior(s):  Depressed Mood: Increase interest, engagement, and pleasure in doing things  Decrease frequency and intensity of feeling down, depressed, hopeless  Improve concentration, focus, and mindfulness in daily activities   Anxiety: will experience a reduction in anxiety, will develop more effective coping skills to manage anxiety symptoms and will develop healthy cognitive patterns and beliefs  PTSD Symptom Management    Current Stressors / Issues:    Karie reported she recently hurt her back. She stated she began [employer] about three weeks ago and feels it is helping her. She stated she feels happiness. She stated it is not usual for her to feel tonya. She described having \"more control of her emotions\". She stated she has been able to get her chores completed. She stated this feeling began about three days ago. She stated for the past week prior " she was very tearful. She did not feel this increase in positive feelings was related to her Bi-polar. She was engaged in session. She began creation of her rating scale for her mental health symptoms. She is very insightful.    Progress on Treatment Objective(s) / Homework:  New Objective established this session - PREPARATION (Decided to change - considering how); Intervened by negotiating a change plan and determining options / strategies for behavior change, identifying triggers, exploring social supports, and working towards setting a date to begin behavior change    Interventions:  Discussed used of Alpha Stim  Possible use of TF/CBT  Supportive listening, encouraged keeping a mood journal  CBT:  Discussed common cognitive distortions identified them in patient's life, Explored ways to challenge, replace, and act against these cognitions  SKILLS TRAINING: Explored skills useful to client in current situation Skills include assertiveness, communication, conflict management, problem-solving, relaxation, etc.  BEHAVIORAL ACTIVATION:  Discussed steps patient can take to become more involved in meaningful activity, Identified barriers to these activities and explored possible solutions  MINDFULLNESS-BASED-STRATEGIES:  Discussed skills based on development and application of mindfulness, Skills drawn from dialectical behavior therapy, mindfulness-based stress reduction, mindfulness-based cognitive therapy, etc.  RELAXATION INTERVENTIONS: Provided education and modeled, deep breathing, Progressive Muscle Relaxation, Guided Imagery, provided werbsite handout to practice at home    Care Plan review completed: Yes    Medication Review:  No changes to current psychiatric medication(s)    Medication Compliance:  Yes    Changes in Health Issues:   None reported    Chemical Use Review:   Substance Use: Chemical use reviewed, no active concerns identified      Tobacco Use: No current tobacco use.      Assessment: Current  Emotional / Mental Status (status of significant symptoms):    Risk status (Self / Other harm or suicidal ideation)  Patient denies current fears or concerns for personal safety.  Patient denies current or recent suicidal ideation or behaviors.  Patient denies current or recent homicidal ideation or behaviors.  Patient denies current or recent self injurious behavior or ideation.  Patient reports no other safety concerns  A safety and risk management plan has not been developed at this time, however patient was encouraged to call [place] [phone] should there be a change in any of these risk factors.    Appearance:   Appropriate   Eye Contact:   Good   Psychomotor Behavior: Normal   Attitude:   Cooperative   Orientation:   All  Speech   Rate / Production: Normal    Volume:  Normal   Mood:    Depressed   Affect:    Appropriate  tearful   Thought Content:  Clear   Thought Form:  Coherent  Logical   Insight:    Good     Collateral Reports Completed:  Not Applicable    Plan: (Homework, other):  Patient was given information about behavioral services and encouraged to schedule a follow up appointment with the clinic  in 2 weeks.  She was also given Mindfulness Strategies to practice when experiencing anxiety and depression.      Lexi Moreno LPCC

## 2018-06-16 ASSESSMENT — PATIENT HEALTH QUESTIONNAIRE - PHQ9: SUM OF ALL RESPONSES TO PHQ QUESTIONS 1-9: 13

## 2018-06-16 ASSESSMENT — ANXIETY QUESTIONNAIRES: GAD7 TOTAL SCORE: 13

## 2023-11-30 ENCOUNTER — TRANSFERRED RECORDS (OUTPATIENT)
Dept: HEALTH INFORMATION MANAGEMENT | Facility: CLINIC | Age: 58
End: 2023-11-30

## 2023-11-30 LAB
ALT SERPL-CCNC: 27 U/L (ref 9–41)
AST SERPL-CCNC: 24 U/L (ref 12–38)
CREATININE (EXTERNAL): 0.7 MG/DL (ref 0.7–1.4)
GFR ESTIMATED (EXTERNAL): >60 ML/MIN/1.73M2
GLUCOSE (EXTERNAL): 66 MG/DL (ref 64–112)
POTASSIUM (EXTERNAL): 4.3 MMOL/L (ref 3.5–5.3)

## 2023-12-09 ENCOUNTER — HEALTH MAINTENANCE LETTER (OUTPATIENT)
Age: 58
End: 2023-12-09

## 2024-01-19 ENCOUNTER — VIRTUAL VISIT (OUTPATIENT)
Dept: OTOLARYNGOLOGY | Facility: OTHER | Age: 59
End: 2024-01-19
Attending: NURSE PRACTITIONER
Payer: MEDICARE

## 2024-01-19 DIAGNOSIS — J30.89 PERENNIAL ALLERGIC RHINITIS: Primary | ICD-10-CM

## 2024-01-19 PROCEDURE — 99442 PR PHYSICIAN TELEPHONE EVALUATION 11-20 MIN: CPT | Performed by: NURSE PRACTITIONER

## 2024-01-19 NOTE — Clinical Note
Please call patient to schedule MQT.  She needs updated med list as well.  Lyrica need to be stopped???  Thanks Jeanne
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
## 2024-01-22 ENCOUNTER — TELEPHONE (OUTPATIENT)
Dept: OTOLARYNGOLOGY | Facility: OTHER | Age: 59
End: 2024-01-22

## 2024-01-22 NOTE — TELEPHONE ENCOUNTER
Patient called to inquire about scheduling allergy testing. She would like a call back from the nurse to schedule her allergy testing. Please advise and call back to schedule.    Lakia Duff

## 2024-01-25 ENCOUNTER — TELEPHONE (OUTPATIENT)
Dept: ALLERGY | Facility: OTHER | Age: 59
End: 2024-01-25

## 2024-01-25 NOTE — TELEPHONE ENCOUNTER
"Call to Pt, Pt identifiers verified.Writer informed Pt of MQT wait-list. Pt indicated frustration with the length of time she may have to wait. She went further into explaining that she had Covid a few weeks ago and has a cough still, but she has been waking up every night  for the past several months. Pt stated, \"I wake up gasping for air with a heaviness in my chest.\" Pt indicated that she came in to be seen by provider on 1/22/24, but felt as if she was \"shooed\" out of the clinic by the nurses as the provider was behind in her schedule. She was offered a virtual visit and did participate but stated frustration as the \"the Dr never laid eyes on her.\" Pt indicated that she is afraid of having these reactions at night and not being able to breathe. Writer clearly stated to Pt that she needed to call 911 or proceed to the nearest Emergency Department if she feels as if she is not able to breath. Writer also talk at length about getting back in for an appointment with her primary care provider. Pt indicated at this time that she has stopped taking some of her medications because they cause an exacerbation of her breathing symptoms at night while she is sleeping.Writer then reiterated that she be seen by her PCP as soon as possible and suggested that she make another appointment with a Provider at ENT to try to assist in mitigating her symptoms until she can get in for an MQT (allergy testing). Writer told her she would have a  call her to schedule ENT appointment if that was okay with her. Pt seemed confident with options and plan. Writer reiterated once more about going to ER or calling 911 if she is having difficult breathing. Pt stated understanding and denied further questions or concerns at call completion.  "

## 2024-02-17 ENCOUNTER — HEALTH MAINTENANCE LETTER (OUTPATIENT)
Age: 59
End: 2024-02-17

## 2024-02-20 ENCOUNTER — TELEPHONE (OUTPATIENT)
Dept: OTOLARYNGOLOGY | Facility: OTHER | Age: 59
End: 2024-02-20

## 2024-02-20 NOTE — TELEPHONE ENCOUNTER
Patient called stating that she expected to have lab orders put in from her appointment that was on 01/19/2024 with Josefina Avelar.  I called patient back and left a voice message to call me back for clarification.  I do not see any labs that were discussed.

## 2024-05-02 ENCOUNTER — TELEPHONE (OUTPATIENT)
Dept: ALLERGY | Facility: OTHER | Age: 59
End: 2024-05-02

## 2024-05-02 NOTE — TELEPHONE ENCOUNTER
Returning message left by Pt. Writer left vm indicating to call back with number provided. Will set Pt up to schedule MQT when call is returned.    Pt called back 2024 and was verified by name and . Pt was scheduled for MQT 24 at 1000.    Went over instructions with patient for allergy skin testing.  Reviewed patients current medications and patient will avoid all contraindicated medications prior to MQT testing.  Patient verbalizes understanding.  Copy of allergy testing packet will be mailed to the patient.  She is advised to call if she has any questions.

## 2024-07-08 ENCOUNTER — TELEPHONE (OUTPATIENT)
Dept: OTOLARYNGOLOGY | Facility: OTHER | Age: 59
End: 2024-07-08

## 2024-07-08 ENCOUNTER — TELEPHONE (OUTPATIENT)
Dept: ALLERGY | Facility: OTHER | Age: 59
End: 2024-07-08

## 2024-07-08 NOTE — CONFIDENTIAL NOTE
July 8, 2024    Patient requesting call back from Allergy nurse to review medication for allergy testing 7/18. Please call back when able    -Nunu Luna on 7/8/2024 at 2:21 PM

## 2024-07-08 NOTE — CONFIDENTIAL NOTE
Patient calling to verify which medications she can take and can't take as prep for her MQT test on . Verified last name and . Went through all patient medications. Patient knows which medications to hold and which ones are okay to continue taking. Patient has no questions or concerns at call completion.

## 2024-07-15 ENCOUNTER — TELEPHONE (OUTPATIENT)
Dept: ALLERGY | Facility: OTHER | Age: 59
End: 2024-07-15

## 2024-07-15 DIAGNOSIS — T78.40XA ALLERGY, INITIAL ENCOUNTER: Primary | ICD-10-CM

## 2024-07-18 ENCOUNTER — OFFICE VISIT (OUTPATIENT)
Dept: OTOLARYNGOLOGY | Facility: OTHER | Age: 59
End: 2024-07-18
Attending: PHYSICIAN ASSISTANT
Payer: MEDICARE

## 2024-07-18 ENCOUNTER — OFFICE VISIT (OUTPATIENT)
Dept: ALLERGY | Facility: OTHER | Age: 59
End: 2024-07-18
Attending: PHYSICIAN ASSISTANT
Payer: MEDICARE

## 2024-07-18 ENCOUNTER — TELEPHONE (OUTPATIENT)
Dept: ALLERGY | Facility: OTHER | Age: 59
End: 2024-07-18

## 2024-07-18 VITALS
RESPIRATION RATE: 16 BRPM | HEART RATE: 72 BPM | TEMPERATURE: 98.8 F | WEIGHT: 205 LBS | SYSTOLIC BLOOD PRESSURE: 134 MMHG | OXYGEN SATURATION: 100 % | DIASTOLIC BLOOD PRESSURE: 78 MMHG | BODY MASS INDEX: 31.07 KG/M2 | HEIGHT: 68 IN

## 2024-07-18 VITALS
OXYGEN SATURATION: 100 % | DIASTOLIC BLOOD PRESSURE: 78 MMHG | TEMPERATURE: 98.8 F | WEIGHT: 205 LBS | SYSTOLIC BLOOD PRESSURE: 134 MMHG | BODY MASS INDEX: 31.07 KG/M2 | HEIGHT: 68 IN | HEART RATE: 72 BPM | RESPIRATION RATE: 16 BRPM

## 2024-07-18 DIAGNOSIS — T78.40XA ALLERGY, INITIAL ENCOUNTER: Primary | ICD-10-CM

## 2024-07-18 DIAGNOSIS — J30.89 ALLERGIC RHINITIS DUE TO DUST: ICD-10-CM

## 2024-07-18 DIAGNOSIS — J30.89 PERENNIAL ALLERGIC RHINITIS: Primary | ICD-10-CM

## 2024-07-18 PROCEDURE — 95004 PERQ TESTS W/ALRGNC XTRCS: CPT

## 2024-07-18 PROCEDURE — G0463 HOSPITAL OUTPT CLINIC VISIT: HCPCS | Performed by: PHYSICIAN ASSISTANT

## 2024-07-18 PROCEDURE — 99213 OFFICE O/P EST LOW 20 MIN: CPT | Mod: 25 | Performed by: PHYSICIAN ASSISTANT

## 2024-07-18 RX ORDER — EPINEPHRINE 0.3 MG/.3ML
0.3 INJECTION SUBCUTANEOUS PRN
Qty: 2 EACH | Status: SHIPPED
Start: 2024-07-18 | End: 2025-07-18

## 2024-07-18 ASSESSMENT — PAIN SCALES - GENERAL
PAINLEVEL: MODERATE PAIN (5)
PAINLEVEL: MODERATE PAIN (5)
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
## 2024-07-18 NOTE — PROGRESS NOTES
This patient presents today for allergy skin testing.      Symptoms have included Extreme itching, stuffy nose, and are worse in spring season.     This patient lives in a multifamily home, does have basement. This patient does suspect mold, water or moisture issues in the home.  There is no carpet in the home, and none in bedroom.  Home has natural gas/hot water heat and does have air conditioning.         This patient has cats for pets.  They are inside.    This patient has not had allergy testing in the past.    This patient's medications have been reviewed prior to testing and all appropriate medications have been stopped.    Consent is signed by patient and signature is verified.     MQT/ID test is performed per protocol.  The patient tolerated testing well. The patient began to feel lightheaded after we started the test. Patient was given apple juice and put her feet up. This seemed to help and the patient felt better. All findings are recorded on the paper flow sheet. Results are reviewed with this patient.  They are given written information regarding allergy.       The patient will follow-up with Dafne Wren PA-C for treatment plan.      Jana Young RN  
No

## 2024-07-18 NOTE — PATIENT INSTRUCTIONS
Restart Xyzal.   Work on allergy avoidance.   Use flonase daily or as needed  Bring Epipen w/ you to start allergy injections.     They will call to schedule your injections.     Thank you for allowing Dafne Wren PA-C and our ENT team to participate in your care.  If your medications are too expensive, please give the nurse a call.  We can possibly change this medication.  If you have a scheduling or an appointment question please contact our Health Unit Coordinator at [phone], [phone]. [phone].    ALL nursing questions or concerns can be directed to your ENT nurse at: [phone] [place]
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
## 2024-07-19 ENCOUNTER — ALLIED HEALTH/NURSE VISIT (OUTPATIENT)
Dept: ALLERGY | Facility: OTHER | Age: 59
End: 2024-07-19
Attending: PHYSICIAN ASSISTANT
Payer: MEDICARE

## 2024-07-19 DIAGNOSIS — T78.40XA ALLERGY, INITIAL ENCOUNTER: Primary | ICD-10-CM

## 2024-07-19 PROCEDURE — 95165 ANTIGEN THERAPY SERVICES: CPT

## 2024-07-19 PROCEDURE — 95165 ANTIGEN THERAPY SERVICES: CPT | Performed by: PHYSICIAN ASSISTANT

## 2024-07-22 NOTE — PROGRESS NOTES
Allergy serum is mixed today at schedule silver,  into  1  (5 ml)  multi dose vial/vials.    Allergens included were:    Ragweed  0.2 ml of dilution # 0  Pigweed  0.2 ml of dilution # 0  Mugwort 0.2 ml of dilution # 0  Kochia  0.2 ml of dilution # 0  Russian Thistle 0.2 ml of dilution # 0  Romario Grass 0.2 ml of dilution # 0  Birch mix 0.2 ml of dilution # 0  Maple Mix 0.2 of dilution # 0  Elm Mix 0.2 ml of dilution # 0  Oak Mix 0.2 ml of dilution # 0  John Mix 0.2 ml of dilution # 0  Pine Mix 0.2 ml of dilution # 0  Eastern [place] 0.2 ml of dilution # 1  Black [place] 0.2 ml of dilution # 0  Aspen 0.2 ml of dilution # 0  Red [place] 0.2 ml of dilution # 0    Alternaria 0.2 ml of dilution # 0  Aspergillus 0.2 ml of dilution # 1  Hormodendrum 0.2 ml of dilution # 0  Helminthosporium 0.2 ml of dilution # 0  Penicillium 0.2 ml of dilution # 0  Epicoccum 0.2 ml of dilution # 0  Fusarium 0.2 ml of dilution # 1  Mucor 0.2 ml of dilution # 0  Grain Smut 0.2 ml of dilution # 0  Grass Smut 0.2 ml of dilution # 0  Cat 0.2 ml of dilution # 1  Dog 0.2 ml of dilution # 0  Feather Mix 0.2 ml of dilution # 0  Dust Mite Mix 0.2 ml of dilution # 1  Horse 0.2 ml of dilution # 0

## 2024-07-23 ENCOUNTER — TELEPHONE (OUTPATIENT)
Dept: ALLERGY | Facility: OTHER | Age: 59
End: 2024-07-23

## 2024-07-23 NOTE — TELEPHONE ENCOUNTER
Call to patient. Verified last name and . Informed patient that her allergy serum has been mixed. Patient is scheduled on . Patient will bring Epipen and has no questions or concerns at crystal completion.

## 2024-08-05 ENCOUNTER — ALLIED HEALTH/NURSE VISIT (OUTPATIENT)
Dept: ALLERGY | Facility: OTHER | Age: 59
End: 2024-08-05
Attending: PHYSICIAN ASSISTANT
Payer: MEDICARE

## 2024-08-05 DIAGNOSIS — T78.40XD ALLERGY, SUBSEQUENT ENCOUNTER: Primary | ICD-10-CM

## 2024-08-05 PROCEDURE — 95115 IMMUNOTHERAPY ONE INJECTION: CPT

## 2024-08-05 NOTE — PROGRESS NOTES
Pt identified using name and date of birth.           Patient presents to allergy clinic for education and training on subcutaneous immunotherapy.  Patient educated on epi pen and an indications for use.  Patient did a return demonstration. Patient verbalizes understanding.  New patient information sheet given and discussed anaphylaxis, local reactions and answered all questions to patients satisfaction.  Patient is aware that they will need an allergy follow up in 6 months.           SVT= 5 mm pass given in left arm from silver vial 1.  Allergy injection/s given and charted on paper allergy flow sheet.  Patient experienced no reaction.  Epi pen is present today.

## 2024-08-12 ENCOUNTER — ALLIED HEALTH/NURSE VISIT (OUTPATIENT)
Dept: ALLERGY | Facility: OTHER | Age: 59
End: 2024-08-12
Attending: PHYSICIAN ASSISTANT
Payer: MEDICARE

## 2024-08-12 DIAGNOSIS — T78.40XD ALLERGY, SUBSEQUENT ENCOUNTER: Primary | ICD-10-CM

## 2024-08-12 PROCEDURE — 95115 IMMUNOTHERAPY ONE INJECTION: CPT

## 2024-08-12 NOTE — PROCEDURES
Prior to injection verified patient identity using patient name and date of birth.    Questions asked: Yes    Patient was given allergy injection(s) of 0.1 mL from Silver vial given in Right arm(s).    Injection(s) charted on paper allergy flow sheet.    Epipen present at appointment.    Patient left against medical advice (AMA), signed form and did not stay for the observation period.  Patient was instructed to seek medical attention/go to the emergency room if having any reaction symptoms or needing to use their Epipen, patient aware and acknowledged. Patient signed out AMA form at 2:12 pm and ambulated out of the clinic.

## 2024-08-19 ENCOUNTER — ALLIED HEALTH/NURSE VISIT (OUTPATIENT)
Dept: ALLERGY | Facility: OTHER | Age: 59
End: 2024-08-19
Attending: PHYSICIAN ASSISTANT
Payer: MEDICARE

## 2024-08-19 DIAGNOSIS — T78.40XD ALLERGY, SUBSEQUENT ENCOUNTER: Primary | ICD-10-CM

## 2024-08-19 PROCEDURE — 95115 IMMUNOTHERAPY ONE INJECTION: CPT

## 2024-08-19 NOTE — PROGRESS NOTES
Prior to injection verified patient identity using patient name and date of birth.    Questions asked: Yes    Patient was given allergy injection(s) of 0.15 mL from Silver vial given in Left arm(s).    Injection(s) charted on paper allergy flow sheet.    Epipen present at appointment.    Patient left against medical advice (AMA), signed form and did not stay for the observation period.  Patient was instructed to seek medical attention/go to the emergency room if having any reaction symptoms or needing to use their Epipen, patient aware and acknowledged. Patient signed out AMA form at 1400 and ambulated out of the clinic.

## 2024-08-23 ENCOUNTER — ALLIED HEALTH/NURSE VISIT (OUTPATIENT)
Dept: ALLERGY | Facility: OTHER | Age: 59
End: 2024-08-23
Attending: PHYSICIAN ASSISTANT
Payer: MEDICARE

## 2024-08-23 DIAGNOSIS — T78.40XD ALLERGY, SUBSEQUENT ENCOUNTER: Primary | ICD-10-CM

## 2024-08-23 PROCEDURE — 95165 ANTIGEN THERAPY SERVICES: CPT

## 2024-08-23 PROCEDURE — 95165 ANTIGEN THERAPY SERVICES: CPT | Performed by: PHYSICIAN ASSISTANT

## 2024-08-23 NOTE — PROGRESS NOTES
Allergy serum is mixed today at schedule silver,  into  1  (5 ml)  multi dose vial/vials.    Allergens included were:    Ragweed  0.2 ml of dilution # 0  Pigweed  0.2 ml of dilution # 0  Mugwort 0.2 ml of dilution # 0  Kochia  0.2 ml of dilution # 0  Russian Thistle 0.2 ml of dilution # 0  Romario Grass 0.2 ml of dilution # 0  Birch mix 0.2 ml of dilution # 0  Maple Mix 0.2 of dilution # 0  Elm Mix 0.2 ml of dilution # 0  Oak Mix 0.2 ml of dilution # 0  John Mix 0.2 ml of dilution # 0  Pine Mix 0.2 ml of dilution # 0  Eastern [place] 0.2 ml of dilution # 1  Black [place] 0.2 ml of dilution # 0  Aspen 0.2 ml of dilution # 0  Red [place] 0.2 ml of dilution # 0    Alternaria 0.2 ml of dilution # 0  Aspergillus 0.2 ml of dilution # 1  Hormodendrum 0.2 ml of dilution # 0  Helminthosporium 0.2 ml of dilution # 0  Penicillium 0.2 ml of dilution # 0  Epicoccum 0.2 ml of dilution # 0  Fusarium 0.2 ml of dilution # 1  Mucor 0.2 ml of dilution # 0  Grain Smut 0.2 ml of dilution # 0  Grass Smut 0.2 ml of dilution # 0  Cat 0.2 ml of dilution # 1  Dog 0.2 ml of dilution # 0  Feather Mix 0.2 ml of dilution # 0  Dust Mite Mix 0.2 ml of dilution # 1  Horse 0.2 ml of dilution # 0

## 2024-08-26 ENCOUNTER — ALLIED HEALTH/NURSE VISIT (OUTPATIENT)
Dept: ALLERGY | Facility: OTHER | Age: 59
End: 2024-08-26
Attending: PHYSICIAN ASSISTANT
Payer: MEDICARE

## 2024-08-26 DIAGNOSIS — T78.40XD ALLERGY, SUBSEQUENT ENCOUNTER: Primary | ICD-10-CM

## 2024-08-26 PROCEDURE — 95115 IMMUNOTHERAPY ONE INJECTION: CPT

## 2024-08-26 NOTE — PROGRESS NOTES
Prior to injection patient identity verified using name and date of birth.     SVT done on right arm, measuring 5 MM. Passed     Documented on paper flow sheet.     Allergy injection given and charted on paper allergy flow sheet. Patient signed out AMA. Prior to injection verified patient identity using patient name and date of birth.    Questions asked: Yes    Patient was given allergy injection(s) of 0.2 mL from Silver vial given in Left arm(s).    Injection(s) charted on paper allergy flow sheet.    Epipen present at appointment.    Patient left against medical advice (AMA), signed form and did not stay for the observation period.  Patient was instructed to seek medical attention/go to the emergency room if having any reaction symptoms or needing to use their Epipen, patient aware and acknowledged. Patient signed out AMA form at 1415 and ambulated out of the clinic.

## 2024-09-03 ENCOUNTER — ALLIED HEALTH/NURSE VISIT (OUTPATIENT)
Dept: ALLERGY | Facility: OTHER | Age: 59
End: 2024-09-03
Attending: PHYSICIAN ASSISTANT
Payer: MEDICARE

## 2024-09-03 DIAGNOSIS — T78.40XD ALLERGY, SUBSEQUENT ENCOUNTER: Primary | ICD-10-CM

## 2024-09-03 PROCEDURE — 95115 IMMUNOTHERAPY ONE INJECTION: CPT

## 2024-09-03 NOTE — PROGRESS NOTES
Prior to injection verified patient identity using patient name and date of birth.    Questions asked: Yes    Patient was given allergy injection(s) of 0.25 mL from Silver vial given in Left arm(s).    Injection(s) charted on paper allergy flow sheet.    Epipen present at appointment.    Patient left against medical advice (AMA), signed form and did not stay for the observation period.  Patient was instructed to seek medical attention/go to the emergency room if having any reaction symptoms or needing to use their Epipen, patient aware and acknowledged. Patient signed out AMA form at 1400 and ambulated out of the clinic.

## 2024-09-09 ENCOUNTER — ALLIED HEALTH/NURSE VISIT (OUTPATIENT)
Dept: ALLERGY | Facility: OTHER | Age: 59
End: 2024-09-09
Attending: PHYSICIAN ASSISTANT
Payer: MEDICARE

## 2024-09-09 DIAGNOSIS — T78.40XD ALLERGY, SUBSEQUENT ENCOUNTER: Primary | ICD-10-CM

## 2024-09-09 PROCEDURE — 95115 IMMUNOTHERAPY ONE INJECTION: CPT

## 2024-09-09 NOTE — PROGRESS NOTES
Prior to injection verified patient identity using patient name and date of birth.    Questions asked: Yes    Patient was given allergy injection(s) of 0.3 mL from Silver vial given in Right arm(s).    Injection(s) charted on paper allergy flow sheet.    Epipen present at appointment.    Patient left against medical advice (AMA), signed form and did not stay for the observation period.  Patient was instructed to seek medical attention/go to the emergency room if having any reaction symptoms or needing to use their Epipen, patient aware and acknowledged. Patient signed out AMA form at 1408 and ambulated out of the clinic.

## 2024-09-16 ENCOUNTER — ALLIED HEALTH/NURSE VISIT (OUTPATIENT)
Dept: ALLERGY | Facility: OTHER | Age: 59
End: 2024-09-16
Attending: PHYSICIAN ASSISTANT
Payer: MEDICARE

## 2024-09-16 DIAGNOSIS — T78.40XD ALLERGY, SUBSEQUENT ENCOUNTER: Primary | ICD-10-CM

## 2024-09-16 PROCEDURE — 95115 IMMUNOTHERAPY ONE INJECTION: CPT

## 2024-09-16 NOTE — PROGRESS NOTES
Prior to injection verified patient identity using patient name and date of birth.    Questions asked: Yes    Patient was given allergy injection(s) of 0.35 mL from Silver vial given in Left arm(s).    Injection(s) charted on paper allergy flow sheet.    Epipen present at appointment.    Patient left against medical advice (AMA), signed form and did not stay for the observation period.  Patient was instructed to seek medical attention/go to the emergency room if having any reaction symptoms or needing to use their Epipen, patient aware and acknowledged. Patient signed out AMA form at 1403 and ambulated out of the clinic.

## 2024-09-23 ENCOUNTER — ALLIED HEALTH/NURSE VISIT (OUTPATIENT)
Dept: ALLERGY | Facility: OTHER | Age: 59
End: 2024-09-23
Attending: PHYSICIAN ASSISTANT
Payer: MEDICARE

## 2024-09-23 DIAGNOSIS — T78.40XD ALLERGY, SUBSEQUENT ENCOUNTER: Primary | ICD-10-CM

## 2024-09-23 PROCEDURE — 95115 IMMUNOTHERAPY ONE INJECTION: CPT

## 2024-09-23 NOTE — PROGRESS NOTES
Prior to injection verified patient identity using patient name and date of birth.    Questions asked: Yes    Patient was given allergy injection(s) of 0.4 mL from Silver vial given in Right arm(s).    Injection(s) charted on paper allergy flow sheet.    Epipen present at appointment.    Patient left against medical advice (AMA), signed form and did not stay for the observation period.  Patient was instructed to seek medical attention/go to the emergency room if having any reaction symptoms or needing to use their Epipen, patient aware and acknowledged. Patient signed out AMA form at 1410 and ambulated out of the clinic.

## 2024-09-30 ENCOUNTER — ALLIED HEALTH/NURSE VISIT (OUTPATIENT)
Dept: ALLERGY | Facility: OTHER | Age: 59
End: 2024-09-30
Attending: PHYSICIAN ASSISTANT
Payer: MEDICARE

## 2024-09-30 DIAGNOSIS — T78.40XD ALLERGY, SUBSEQUENT ENCOUNTER: Primary | ICD-10-CM

## 2024-09-30 PROCEDURE — 95115 IMMUNOTHERAPY ONE INJECTION: CPT

## 2024-09-30 NOTE — PROGRESS NOTES
Prior to injection verified patient identity using patient name and date of birth.    Questions asked: Yes    Patient was given allergy injection(s) of 0.45 mL from Silver vial given in Left arm(s).    Injection(s) charted on paper allergy flow sheet.    Epipen present at appointment.    Patient left against medical advice (AMA), signed form and did not stay for the observation period.  Patient was instructed to seek medical attention/go to the emergency room if having any reaction symptoms or needing to use their Epipen, patient aware and acknowledged. Patient signed out AMA form at 1412 and ambulated out of the clinic.

## 2024-10-03 ENCOUNTER — ALLIED HEALTH/NURSE VISIT (OUTPATIENT)
Dept: ALLERGY | Facility: OTHER | Age: 59
End: 2024-10-03
Attending: PHYSICIAN ASSISTANT
Payer: MEDICARE

## 2024-10-03 DIAGNOSIS — T78.40XD ALLERGY, SUBSEQUENT ENCOUNTER: Primary | ICD-10-CM

## 2024-10-03 PROCEDURE — 95165 ANTIGEN THERAPY SERVICES: CPT

## 2024-10-03 PROCEDURE — 95165 ANTIGEN THERAPY SERVICES: CPT | Performed by: PHYSICIAN ASSISTANT

## 2024-10-03 NOTE — PROGRESS NOTES
Allergy serum is mixed today at schedule red maintenance,  into  1  (5 ml)  multi dose vial/vials.    Allergens included were:    Ragweed  0.2 ml of dilution # 0  Pigweed  0.2 ml of dilution # 0  Mugwort 0.2 ml of dilution # 0  Kochia  0.2 ml of dilution # 0  Russian Thistle 0.2 ml of dilution # 0  Romario Grass 0.2 ml of dilution # 0  Birch mix 0.2 ml of dilution # 0  Maple Mix 0.2 of dilution # 0  Elm Mix 0.2 ml of dilution # 0  Oak Mix 0.2 ml of dilution # 0  John Mix 0.2 ml of dilution # 0  Pine Mix 0.2 ml of dilution # 0  Eastern [place] 0.2 ml of dilution # 1  Black [place] 0.2 ml of dilution # 0  Aspen 0.2 ml of dilution # 0  Red [place] 0.2 ml of dilution # 0    Alternaria 0.2 ml of dilution # 0  Aspergillus 0.2 ml of dilution # 1  Hormodendrum 0.2 ml of dilution # 0  Helminthosporium 0.2 ml of dilution # 0  Penicillium 0.2 ml of dilution # 0  Epicoccum 0.2 ml of dilution # 0  Fusarium 0.2 ml of dilution # 1  Mucor 0.2 ml of dilution # 0  Grain Smut 0.2 ml of dilution # 0  Grass Smut 0.2 ml of dilution # 0  Cat 0.2 ml of dilution # 1  Dog 0.2 ml of dilution # 0  Feather Mix 0.2 ml of dilution # 0  Dust Mite Mix 0.2 ml of dilution # 1  Horse 0.2 ml of dilution # 0

## 2024-10-07 ENCOUNTER — ALLIED HEALTH/NURSE VISIT (OUTPATIENT)
Dept: ALLERGY | Facility: OTHER | Age: 59
End: 2024-10-07
Attending: PHYSICIAN ASSISTANT
Payer: MEDICARE

## 2024-10-07 DIAGNOSIS — T78.40XD ALLERGY, SUBSEQUENT ENCOUNTER: Primary | ICD-10-CM

## 2024-10-07 PROCEDURE — 95115 IMMUNOTHERAPY ONE INJECTION: CPT

## 2024-10-07 NOTE — PROGRESS NOTES
Prior to injection verified patient identity using patient name and date of birth.    Questions asked: Yes    Patient was given allergy injection(s) of 0.5 mL from Silver vial given in Right arm(s).    Injection(s) charted on paper allergy flow sheet.    Epipen present at appointment.    Patient left against medical advice (AMA), signed form and did not stay for the observation period.  Patient was instructed to seek medical attention/go to the emergency room if having any reaction symptoms or needing to use their Epipen, patient aware and acknowledged. Patient signed out AMA form at 1402 and ambulated out of the clinic.

## 2024-10-14 ENCOUNTER — ALLIED HEALTH/NURSE VISIT (OUTPATIENT)
Dept: ALLERGY | Facility: OTHER | Age: 59
End: 2024-10-14
Attending: PHYSICIAN ASSISTANT
Payer: MEDICARE

## 2024-10-14 DIAGNOSIS — T78.40XD ALLERGY, SUBSEQUENT ENCOUNTER: Primary | ICD-10-CM

## 2024-10-14 PROCEDURE — 95115 IMMUNOTHERAPY ONE INJECTION: CPT

## 2024-10-14 NOTE — PROGRESS NOTES
Prior to injection patient identity verified using name and date of birth.     SVT done on left arm, measuring 6 MM. Passed     Documented on paper flow sheet.     Allergy injection given and charted on paper allergy flow sheet. Patient signed out AMA. Prior to injection verified patient identity using patient name and date of birth.    Questions asked: Yes    Patient was given allergy injection(s) of 0.5 mL from Red vial given in Left arm(s).    Injection(s) charted on paper allergy flow sheet.    Epipen present at appointment.    Patient left against medical advice (AMA), signed form and did not stay for the observation period.  Patient was instructed to seek medical attention/go to the emergency room if having any reaction symptoms or needing to use their Epipen, patient aware and acknowledged. Patient signed out AMA form at 1410 and ambulated out of the clinic.

## 2024-10-21 ENCOUNTER — ALLIED HEALTH/NURSE VISIT (OUTPATIENT)
Dept: ALLERGY | Facility: OTHER | Age: 59
End: 2024-10-21
Attending: PHYSICIAN ASSISTANT
Payer: MEDICARE

## 2024-10-21 DIAGNOSIS — T78.40XD ALLERGY, SUBSEQUENT ENCOUNTER: Primary | ICD-10-CM

## 2024-10-21 PROCEDURE — 95115 IMMUNOTHERAPY ONE INJECTION: CPT

## 2024-10-21 NOTE — PROGRESS NOTES
Prior to injection verified patient identity using patient name and date of birth.    Questions asked: Yes    Patient was given allergy injection(s) of 0.5 mL from Red vial given in Right arm(s).    Injection(s) charted on paper allergy flow sheet.    Epipen present at appointment.    Patient left against medical advice (AMA), signed form and did not stay for the observation period.  Patient was instructed to seek medical attention/go to the emergency room if having any reaction symptoms or needing to use their Epipen, patient aware and acknowledged. Patient signed out AMA form at 1418 and ambulated out of the clinic.

## 2024-10-28 ENCOUNTER — ALLIED HEALTH/NURSE VISIT (OUTPATIENT)
Dept: ALLERGY | Facility: OTHER | Age: 59
End: 2024-10-28
Attending: PHYSICIAN ASSISTANT
Payer: MEDICARE

## 2024-10-28 DIAGNOSIS — T78.40XD ALLERGY, SUBSEQUENT ENCOUNTER: Primary | ICD-10-CM

## 2024-10-28 PROCEDURE — 95115 IMMUNOTHERAPY ONE INJECTION: CPT

## 2024-10-28 NOTE — PROGRESS NOTES
Prior to injection verified patient identity using patient name and date of birth.    Questions asked: Yes    Patient was given allergy injection(s) of 0.5 mL from Red vial given in Left arm(s).    Injection(s) charted on paper allergy flow sheet.    Epipen present at appointment.    Patient left against medical advice (AMA), signed form and did not stay for the observation period.  Patient was instructed to seek medical attention/go to the emergency room if having any reaction symptoms or needing to use their Epipen, patient aware and acknowledged. Patient signed out AMA form at 1352 and ambulated out of the clinic.

## 2025-03-09 ENCOUNTER — HEALTH MAINTENANCE LETTER (OUTPATIENT)
Age: 60
End: 2025-03-09